# Patient Record
Sex: FEMALE | Race: WHITE
[De-identification: names, ages, dates, MRNs, and addresses within clinical notes are randomized per-mention and may not be internally consistent; named-entity substitution may affect disease eponyms.]

---

## 2019-12-19 ENCOUNTER — HOSPITAL ENCOUNTER (OUTPATIENT)
Dept: HOSPITAL 53 - M LAB REF | Age: 49
End: 2019-12-19
Attending: FAMILY MEDICINE
Payer: COMMERCIAL

## 2019-12-19 DIAGNOSIS — L82.1: Primary | ICD-10-CM

## 2021-10-20 ENCOUNTER — HOSPITAL ENCOUNTER (OUTPATIENT)
Dept: HOSPITAL 53 - M OPP | Age: 51
Discharge: HOME | End: 2021-10-20
Attending: INTERNAL MEDICINE
Payer: COMMERCIAL

## 2021-10-20 VITALS — DIASTOLIC BLOOD PRESSURE: 83 MMHG | SYSTOLIC BLOOD PRESSURE: 137 MMHG

## 2021-10-20 VITALS — BODY MASS INDEX: 31.28 KG/M2 | WEIGHT: 170 LBS | HEIGHT: 62 IN

## 2021-10-20 DIAGNOSIS — Z88.1: ICD-10-CM

## 2021-10-20 DIAGNOSIS — Z79.899: ICD-10-CM

## 2021-10-20 DIAGNOSIS — K21.9: ICD-10-CM

## 2021-10-20 DIAGNOSIS — R13.10: ICD-10-CM

## 2021-10-20 DIAGNOSIS — Z88.8: ICD-10-CM

## 2021-10-20 DIAGNOSIS — R12: Primary | ICD-10-CM

## 2021-10-20 PROCEDURE — 43239 EGD BIOPSY SINGLE/MULTIPLE: CPT

## 2021-10-20 PROCEDURE — 43249 ESOPH EGD DILATION <30 MM: CPT

## 2021-10-20 PROCEDURE — 88305 TISSUE EXAM BY PATHOLOGIST: CPT

## 2021-10-20 NOTE — ROOR
________________________________________________________________________________

Patient Name: Corinne Lindsey            Procedure Date: 10/20/2021 11:53 AM

MRN: [id number]                          Account Number: [id number]

YOB: 1970               Age: 51

Room: [facility]                            Gender: Female

Note Status: Finalized                 

________________________________________________________________________________

 

Procedure:            Upper GI endoscopy + Balloon Dilatation + Biopsies

Indications:          Dysphagia, Heartburn

Providers:            Charly Palacios MD

Referring MD:         Too Richardson Do

Requesting Provider:  

Medicines:            Monitored Anesthesia Care

Complications:        No immediate complications.

________________________________________________________________________________

Procedure:            Pre-Anesthesia Assessment:

                      - The heart rate, respiratory rate, oxygen saturations, 

                      blood pressure, adequacy of pulmonary ventilation, and 

                      response to care were monitored throughout the procedure.

                      The Endoscope was introduced through the mouth, and 

                      advanced to the second part of duodenum. The upper GI 

                      endoscopy was accomplished without difficulty. The 

                      patient tolerated the procedure well.

                                                                                

Findings:

     The Z-line was regular and was found 35 cm from the incisors. A TTS 

     dilator was passed through the scope. Dilation with an 18-19-20 mm 

     balloon dilator was performed to 20 mm. The dilation site was examined 

     and showed no change.

     Multiple biopsies were obtained with cold forceps for evaluation of 

     eosinophilic esophagitis randomly in the mid esophagus.

     No other significant abnormalities were identified in a careful 

     examination of the stomach.

     The exam of the duodenum was otherwise normal.

                                                                                

Impression:           - Z-line regular, 35 cm from the incisors. Dilated.

                      - Multiple biopsies were obtained in the mid esophagus.

                      - The examination was otherwise normal.

Recommendation:       - Patient has a contact number available for 

                      emergencies. The signs and symptoms of potential delayed 

                      complications were discussed with the patient. Return to 

                      normal activities tomorrow. Written discharge 

                      instructions were provided to the patient.

                      - Resume previous diet.

                      - Discharge patient to home.

                      - Follow an antireflux regimen.

                      - Continue present medications.

                      - Await pathology results.

                      - Telephone GI clinic for pathology results in 1 week.

                      - Return to referring physician.

                      - Return to GI office in 2 months.

                      - The findings and recommendations were discussed with 

                      the patient.

                                                                                

Procedure Code(s):    --- Professional ---

                      72196, Esophagogastroduodenoscopy, flexible, transoral; 

                      with transendoscopic balloon dilation of esophagus (less 

                      than 30 mm diameter)

Diagnosis Code(s):    --- Professional ---

                      R13.10, Dysphagia, unspecified

                      R12, Heartburn

 

CPT copyright 2019 [employer]. All rights reserved.

 

The codes documented in this report are preliminary and upon  review may 

be revised to meet current compliance requirements.

 

Charly Palacios MD

____________________

Charly Palacios MD

10/20/2021 12:10:37 PM

Electronically signed by Charly Palacios MD

Number of Addenda: 0

 

Note Initiated On: 10/20/2021 11:53 AM

Estimated Blood Loss: Estimated blood loss: none.